# Patient Record
Sex: MALE | Race: WHITE | NOT HISPANIC OR LATINO | URBAN - METROPOLITAN AREA
[De-identification: names, ages, dates, MRNs, and addresses within clinical notes are randomized per-mention and may not be internally consistent; named-entity substitution may affect disease eponyms.]

---

## 2023-05-17 ENCOUNTER — TELEPHONE (OUTPATIENT)
Dept: PSYCHIATRY | Facility: CLINIC | Age: 43
End: 2023-05-17

## 2023-05-17 NOTE — TELEPHONE ENCOUNTER
Patient has been added to the [facility]ication [facility] therapy wait list without a referral     Insurance: [employer]  Insurance Type:    Commercial []   Medicaid [x]   [place] (if applicable) carbon   Medicare []  [place]  Provider Preference: male  Virtual: Yes [] No [x]